# Patient Record
Sex: MALE | Race: WHITE | Employment: FULL TIME | URBAN - METROPOLITAN AREA
[De-identification: names, ages, dates, MRNs, and addresses within clinical notes are randomized per-mention and may not be internally consistent; named-entity substitution may affect disease eponyms.]

---

## 2019-05-08 ENCOUNTER — HOSPITAL ENCOUNTER (EMERGENCY)
Age: 59
Discharge: HOME | End: 2019-05-08
Attending: EMERGENCY MEDICINE
Payer: COMMERCIAL

## 2019-05-08 ENCOUNTER — APPOINTMENT (OUTPATIENT)
Dept: GENERAL RADIOLOGY | Age: 59
End: 2019-05-08
Attending: EMERGENCY MEDICINE
Payer: COMMERCIAL

## 2019-05-08 VITALS
SYSTOLIC BLOOD PRESSURE: 127 MMHG | WEIGHT: 176 LBS | HEIGHT: 71 IN | TEMPERATURE: 98.6 F | HEART RATE: 67 BPM | OXYGEN SATURATION: 94 % | DIASTOLIC BLOOD PRESSURE: 69 MMHG | BODY MASS INDEX: 24.64 KG/M2 | RESPIRATION RATE: 12 BRPM

## 2019-05-08 DIAGNOSIS — R07.9 ACUTE CHEST PAIN: Primary | ICD-10-CM

## 2019-05-08 LAB
ALBUMIN SERPL-MCNC: 3.7 G/DL (ref 3.5–5)
ALBUMIN/GLOB SERPL: 1.1 {RATIO} (ref 1.1–2.2)
ALP SERPL-CCNC: 148 U/L (ref 45–117)
ALT SERPL-CCNC: 28 U/L (ref 12–78)
ANION GAP SERPL CALC-SCNC: 4 MMOL/L (ref 5–15)
AST SERPL-CCNC: 18 U/L (ref 15–37)
ATRIAL RATE: 72 BPM
BASOPHILS # BLD: 0 K/UL (ref 0–0.1)
BASOPHILS NFR BLD: 1 % (ref 0–1)
BILIRUB SERPL-MCNC: 0.9 MG/DL (ref 0.2–1)
BUN SERPL-MCNC: 12 MG/DL (ref 6–20)
BUN/CREAT SERPL: 9 (ref 12–20)
CALCIUM SERPL-MCNC: 9.4 MG/DL (ref 8.5–10.1)
CALCULATED P AXIS, ECG09: 69 DEGREES
CALCULATED R AXIS, ECG10: 10 DEGREES
CALCULATED T AXIS, ECG11: 38 DEGREES
CHLORIDE SERPL-SCNC: 103 MMOL/L (ref 97–108)
CO2 SERPL-SCNC: 32 MMOL/L (ref 21–32)
COMMENT, HOLDF: NORMAL
CREAT SERPL-MCNC: 1.33 MG/DL (ref 0.7–1.3)
D DIMER PPP FEU-MCNC: 0.3 MG/L FEU (ref 0–0.65)
DIAGNOSIS, 93000: NORMAL
DIFFERENTIAL METHOD BLD: ABNORMAL
EOSINOPHIL # BLD: 0.1 K/UL (ref 0–0.4)
EOSINOPHIL NFR BLD: 2 % (ref 0–7)
ERYTHROCYTE [DISTWIDTH] IN BLOOD BY AUTOMATED COUNT: 12.9 % (ref 11.5–14.5)
GLOBULIN SER CALC-MCNC: 3.4 G/DL (ref 2–4)
GLUCOSE SERPL-MCNC: 93 MG/DL (ref 65–100)
HCT VFR BLD AUTO: 52.3 % (ref 36.6–50.3)
HGB BLD-MCNC: 17.1 G/DL (ref 12.1–17)
IMM GRANULOCYTES # BLD AUTO: 0 K/UL (ref 0–0.04)
IMM GRANULOCYTES NFR BLD AUTO: 0 % (ref 0–0.5)
LYMPHOCYTES # BLD: 1.6 K/UL (ref 0.8–3.5)
LYMPHOCYTES NFR BLD: 29 % (ref 12–49)
MCH RBC QN AUTO: 29.5 PG (ref 26–34)
MCHC RBC AUTO-ENTMCNC: 32.7 G/DL (ref 30–36.5)
MCV RBC AUTO: 90.3 FL (ref 80–99)
MONOCYTES # BLD: 0.7 K/UL (ref 0–1)
MONOCYTES NFR BLD: 13 % (ref 5–13)
NEUTS SEG # BLD: 3.2 K/UL (ref 1.8–8)
NEUTS SEG NFR BLD: 56 % (ref 32–75)
NRBC # BLD: 0 K/UL (ref 0–0.01)
NRBC BLD-RTO: 0 PER 100 WBC
P-R INTERVAL, ECG05: 182 MS
PLATELET # BLD AUTO: 125 K/UL (ref 150–400)
PMV BLD AUTO: 12 FL (ref 8.9–12.9)
POTASSIUM SERPL-SCNC: 3.8 MMOL/L (ref 3.5–5.1)
PROT SERPL-MCNC: 7.1 G/DL (ref 6.4–8.2)
Q-T INTERVAL, ECG07: 398 MS
QRS DURATION, ECG06: 86 MS
QTC CALCULATION (BEZET), ECG08: 435 MS
RBC # BLD AUTO: 5.79 M/UL (ref 4.1–5.7)
SAMPLES BEING HELD,HOLD: NORMAL
SODIUM SERPL-SCNC: 139 MMOL/L (ref 136–145)
TROPONIN I BLD-MCNC: <0.04 NG/ML (ref 0–0.08)
TROPONIN I SERPL-MCNC: <0.05 NG/ML
VENTRICULAR RATE, ECG03: 72 BPM
WBC # BLD AUTO: 5.6 K/UL (ref 4.1–11.1)

## 2019-05-08 PROCEDURE — 84484 ASSAY OF TROPONIN QUANT: CPT

## 2019-05-08 PROCEDURE — 99285 EMERGENCY DEPT VISIT HI MDM: CPT

## 2019-05-08 PROCEDURE — 71046 X-RAY EXAM CHEST 2 VIEWS: CPT

## 2019-05-08 PROCEDURE — 85379 FIBRIN DEGRADATION QUANT: CPT

## 2019-05-08 PROCEDURE — 36415 COLL VENOUS BLD VENIPUNCTURE: CPT

## 2019-05-08 PROCEDURE — 80053 COMPREHEN METABOLIC PANEL: CPT

## 2019-05-08 PROCEDURE — 93005 ELECTROCARDIOGRAM TRACING: CPT

## 2019-05-08 PROCEDURE — 85025 COMPLETE CBC W/AUTO DIFF WBC: CPT

## 2019-05-08 RX ORDER — SODIUM CHLORIDE 0.9 % (FLUSH) 0.9 %
5-40 SYRINGE (ML) INJECTION AS NEEDED
Status: DISCONTINUED | OUTPATIENT
Start: 2019-05-08 | End: 2019-05-08 | Stop reason: HOSPADM

## 2019-05-08 RX ORDER — SODIUM CHLORIDE 0.9 % (FLUSH) 0.9 %
5-40 SYRINGE (ML) INJECTION EVERY 8 HOURS
Status: DISCONTINUED | OUTPATIENT
Start: 2019-05-08 | End: 2019-05-08 | Stop reason: HOSPADM

## 2019-05-08 NOTE — ED PROVIDER NOTES
Hx asthma, GERD; presents with left-sided CP; awoke with it this morning approximately 1 hour prior to arrival; describes it as dull and pressure; worse with inspiration; admitted for CP 2012 and had negative w/up; blamed on GERD at the time and eventually had esophageal surgery; no problems with GERD since surgery per pt. Pain radiates to neck and left shoulder. + dyspnea.  + N. No hx HTN, DM, hyperlipidemia, smoking, known FH CAD. No hx DVT/PE. He does travel for work. Past Medical History:  
Diagnosis Date  Asthma  GERD (gastroesophageal reflux disease) Past Surgical History:  
Procedure Laterality Date  HX TONSILLECTOMY History reviewed. No pertinent family history. Social History Socioeconomic History  Marital status:  Spouse name: Not on file  Number of children: Not on file  Years of education: Not on file  Highest education level: Not on file Occupational History  Not on file Social Needs  Financial resource strain: Not on file  Food insecurity:  
  Worry: Not on file Inability: Not on file  Transportation needs:  
  Medical: Not on file Non-medical: Not on file Tobacco Use  Smoking status: Never Smoker Substance and Sexual Activity  Alcohol use: No  
 Drug use: Not on file  Sexual activity: Not on file Lifestyle  Physical activity:  
  Days per week: Not on file Minutes per session: Not on file  Stress: Not on file Relationships  Social connections:  
  Talks on phone: Not on file Gets together: Not on file Attends Caodaism service: Not on file Active member of club or organization: Not on file Attends meetings of clubs or organizations: Not on file Relationship status: Not on file  Intimate partner violence:  
  Fear of current or ex partner: Not on file Emotionally abused: Not on file Physically abused: Not on file Forced sexual activity: Not on file Other Topics Concern  Not on file Social History Narrative  Not on file ALLERGIES: Patient has no known allergies. Review of Systems All other systems reviewed and are negative. Vitals:  
 05/08/19 0555 BP: 142/87 Pulse: 75 Resp: 16 Temp: 98.2 °F (36.8 °C) SpO2: 100% Physical Exam  
Constitutional: He appears well-developed and well-nourished. HENT:  
Head: Normocephalic and atraumatic. Eyes: Conjunctivae are normal.  
Neck: Neck supple. No tracheal deviation present. Cardiovascular: Normal rate, regular rhythm, normal heart sounds and intact distal pulses. Exam reveals no gallop and no friction rub. No murmur heard. Pulmonary/Chest: Effort normal and breath sounds normal.  
Abdominal: Soft. There is no tenderness. Musculoskeletal: He exhibits no edema or deformity. Neurological: He is alert. oriented Skin: Skin is warm and dry. Psychiatric: He has a normal mood and affect. Nursing note and vitals reviewed. MDM Procedures EKG: NSR; rate - 72; normal STOracio MD 
6:15 AM 
 
A/P: awoke with CP this AM one hour prior to arrival; pleuritic in nature; reassuring appearance and exam; VSS; CBC, CMP, D-dimer, trop times 2, EKG, CXR ok; home with PCP/cards f/u.   Cristina Tran MD

## 2019-05-08 NOTE — ED TRIAGE NOTES
Triage note : pt arrives via private car with c/o  Left sided chest discomfort 5/10 that started at 5am   , sob ,  Nausea , and headache  . Pt ambulated to the room . Pt is a&o x4 .

## 2019-05-08 NOTE — ED NOTES
6:54 AM 
Change of shift. Care of patient taken over from Dr. Milena Correia to Dr. Gerri Salcedo; H&P reviewed, bedside handoff complete. Awaiting repeat troponin. 9:26 AM 
Repeat trop neg. VS stable. No symptoms at this time.  
Stable for discharge home to f/u with cardiologist.

## 2019-05-08 NOTE — DISCHARGE INSTRUCTIONS

## 2019-05-23 ENCOUNTER — OFFICE VISIT (OUTPATIENT)
Dept: CARDIOLOGY CLINIC | Age: 59
End: 2019-05-23

## 2019-05-23 VITALS
DIASTOLIC BLOOD PRESSURE: 72 MMHG | BODY MASS INDEX: 24.78 KG/M2 | OXYGEN SATURATION: 98 % | SYSTOLIC BLOOD PRESSURE: 110 MMHG | WEIGHT: 177 LBS | HEART RATE: 78 BPM | HEIGHT: 71 IN

## 2019-05-23 DIAGNOSIS — R07.9 CHEST PAIN, UNSPECIFIED TYPE: Primary | ICD-10-CM

## 2019-05-23 RX ORDER — HYDROCORTISONE 25 MG/G
CREAM TOPICAL 2 TIMES DAILY
COMMUNITY

## 2019-05-23 NOTE — PROGRESS NOTES
Chief Complaint   Patient presents with    Chest Pain (Angina)    New Patient     Visit Vitals  /72 (BP 1 Location: Left arm, BP Patient Position: Sitting)   Pulse 78   Ht 5' 11\" (1.803 m)   Wt 177 lb (80.3 kg)   SpO2 98%   BMI 24.69 kg/m²     Chest pain has had CP again, but not as bad. SOB around ED visit, dissipated after. Dizziness around ED, not since. Occasional bouts. More nausea than dizziness. Swelling denied  Recent hospital visit: 2 weeks ago, was having severe left chest pain, radiating to left arm. Don't think it was an MI, but they couldn't tell me what it was. 5/8/19      Hx of severe GERD, poor diet, drink soda. Consultant. ADHD, get hyper focussed.

## 2019-05-23 NOTE — PROGRESS NOTES
Pilo Baker MD. [facility]              Patient: Bridgette Dewey  : 1960      Today's Date: 2019            HISTORY OF PRESENT ILLNESS:     History of Present Illness:  Referred for chest pain from the ER. Woke up with severe pain radiating to left arm and chest.  Went to [facility] ER on 19. Sent home after ER workup OK. Had mild residual pain for a couple of days, but better since then. No exertional chest pain. I last saw him in  - has done well past several years. PAST MEDICAL HISTORY:     Past Medical History:   Diagnosis Date    Asthma     GERD (gastroesophageal reflux disease)     Hiatal hernia     hiatal hernia surgery     S/P cholecystectomy        Past Surgical History:   Procedure Laterality Date    HX TONSILLECTOMY           MEDICATIONS:     Current Outpatient Medications   Medication Sig Dispense Refill    hydrocortisone (HYTONE) 2.5 % topical cream Apply  to affected area two (2) times a day. use thin layer   Indications: For ears      Lactobacillus acidophilus (PROBIOTIC PO) Take  by mouth.  calcium citrate/vitamin D3 (CALCITRATE-VITAMIN D PO) Take  by mouth.  folic acid/multivit-min/lutein (CENTRUM SILVER PO) Take  by mouth.  vitamin E acetate (VITAMIN E PO) Take  by mouth.  cetirizine (ZYRTEC) 10 mg tablet Take 10 mg by mouth nightly as needed for Allergies.  naproxen sodium (ALEVE) 220 mg tablet Take 220 mg by mouth two (2) times daily as needed. No Known Allergies          SOCIAL HISTORY:     Social History     Tobacco Use    Smoking status: Never Smoker    Smokeless tobacco: Never Used   Substance Use Topics    Alcohol use:  Yes     Alcohol/week: 1.2 oz     Types: 2 Glasses of wine per week     Frequency: 2-4 times a month     Drinks per session: 1 or 2     Binge frequency: Never    Drug use: Not Currently         FAMILY HISTORY:     Family History   Problem Relation Age of Onset    Heart Attack Father              REVIEW OF SYMPTOMS:     Review of Symptoms:  Constitutional: + fever 102 degrees 2 weeks ago   HEENT: Negative for nosebleeds, tinnitus, and vision changes. Respiratory: Negative for cough, wheezing  Cardiovascular: Negative for orthopnea, claudication, syncope, and PND. Gastrointestinal: Negative for abdominal pain, diarrhea, melena. Genitourinary: Negative for dysuria  Musculoskeletal: Negative for myalgias. Skin: Negative for rash  Heme: No problems bleeding. Neurological: Negative for speech change and focal weakness. PHYSICAL EXAM:     Physical Exam:  Visit Vitals  /72 (BP 1 Location: Left arm, BP Patient Position: Sitting)   Pulse 78   Ht 5' 11\" (1.803 m)   Wt 177 lb (80.3 kg)   SpO2 98%   BMI 24.69 kg/m²     Patient appears generally well, mood and affect are appropriate and pleasant. HEENT:  Hearing intact, non-icteric, normocephalic, atraumatic. Neck Exam: Supple, No JVD or carotid bruits. Lung Exam: Clear to auscultation, even breath sounds. Cardiac Exam: Regular rate and rhythm with no murmur  Abdomen: Soft, non-tender, normal bowel sounds. No bruits or masses. Extremities: Moves all ext well. No lower extremity edema. Vascular: 2+ dorsalis pedis pulses bilaterally. Psych: Appropriate affect  Neuro - Grossly intact      LABS / OTHER STUDIES:       Lab Results   Component Value Date/Time    Sodium 139 05/08/2019 05:57 AM    Potassium 3.8 05/08/2019 05:57 AM    Chloride 103 05/08/2019 05:57 AM    CO2 32 05/08/2019 05:57 AM    Anion gap 4 (L) 05/08/2019 05:57 AM    Glucose 93 05/08/2019 05:57 AM    BUN 12 05/08/2019 05:57 AM    Creatinine 1.33 (H) 05/08/2019 05:57 AM    BUN/Creatinine ratio 9 (L) 05/08/2019 05:57 AM    GFR est AA >60 05/08/2019 05:57 AM    GFR est non-AA 55 (L) 05/08/2019 05:57 AM    Calcium 9.4 05/08/2019 05:57 AM    Bilirubin, total 0.9 05/08/2019 05:57 AM    AST (SGOT) 18 05/08/2019 05:57 AM    Alk.  phosphatase 148 (H) 05/08/2019 05:57 AM    Protein, total 7.1 05/08/2019 05:57 AM    Albumin 3.7 05/08/2019 05:57 AM    Globulin 3.4 05/08/2019 05:57 AM    A-G Ratio 1.1 05/08/2019 05:57 AM    ALT (SGPT) 28 05/08/2019 05:57 AM     Lab Results   Component Value Date/Time    WBC 5.6 05/08/2019 05:57 AM    HGB 17.1 (H) 05/08/2019 05:57 AM    HCT 52.3 (H) 05/08/2019 05:57 AM    PLATELET 697 (L) 80/04/2652 05:57 AM    MCV 90.3 05/08/2019 05:57 AM             CARDIAC DIAGNOSTICS:     Cardiac Evaluation Includes:    EKG 5/8/19 - NSR, normal   - I viewed EKG myself     Exercise Cardiolite 1/10/12 - walked 10 min, normal stress EKG and MPI, LVEF 53%    Echo 1/9/12 - LVEF 55%    CT Heart Scan 1/11/12 - CAC score 2. At 20th%        ASSESSMENT AND PLAN:     Assessment and Plan:  1) Atypical chest pain   - Will check a treadmill stress test to rule out high risk CAD findings     2) CV risk management per PCP  - lipids followed by PCP - total chol 140 recently per patient   - He typically eats healthy - stays active     3) Phone FU after testing. See me as needed. Patient expressed understanding of the plan - questions were answered. 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 Kurt Anderson MD, [address]  Ph: [phone]   Ph [phone]      ADDENDUM   5/26/2019  Treadmill Stress test 5/24/19 - walked 11 min (13.4 METS), normal stress EKG. Normal study.      Patient given results after his test.

## 2023-06-26 NOTE — ED NOTES
Discharge instructions given to patient by MD. Pt has been given counseling on medication use and verbalizes understanding. IV d/c. Pt ambulated off of unit in no signs of distress. Stable

## 2024-02-23 ENCOUNTER — OFFICE VISIT (OUTPATIENT)
Age: 64
End: 2024-02-23

## 2024-02-23 VITALS
OXYGEN SATURATION: 95 % | TEMPERATURE: 98.6 F | HEIGHT: 71 IN | BODY MASS INDEX: 23.79 KG/M2 | WEIGHT: 169.9 LBS | SYSTOLIC BLOOD PRESSURE: 121 MMHG | HEART RATE: 82 BPM | DIASTOLIC BLOOD PRESSURE: 76 MMHG | RESPIRATION RATE: 18 BRPM

## 2024-02-23 DIAGNOSIS — J06.9 VIRAL UPPER RESPIRATORY ILLNESS: Primary | ICD-10-CM

## 2024-02-23 LAB
Lab: NORMAL
PERFORMING INSTRUMENT: NORMAL
QC PASS/FAIL: NORMAL
SARS-COV-2, POC: NORMAL

## 2024-02-23 ASSESSMENT — ENCOUNTER SYMPTOMS
COUGH: 1
SHORTNESS OF BREATH: 0

## 2024-02-23 NOTE — PROGRESS NOTES
Normocephalic and atraumatic.   Cardiovascular:      Rate and Rhythm: Normal rate and regular rhythm.      Pulses: Normal pulses.      Heart sounds: Normal heart sounds.   Pulmonary:      Effort: Pulmonary effort is normal.      Breath sounds: Normal breath sounds.   Skin:     General: Skin is warm and dry.   Neurological:      Mental Status: He is alert and oriented to person, place, and time.         Assessment & Plan     Diagnoses and all orders for this visit:  Respiratory symptoms  -     POCT COVID-19, Antigen      No orders to display   Vital signs stable  Pt in no acute distress and afebrile  Pt alert and competent    Patient is previously healthy and immunocompetent, presenting with symptoms suspicious for likely viral upper respiratory infection.  Differential includes acute bronchitis, rhinosinusitis, allergic rhinitis, bacterial pneumonia, or COVID.  Do not suspect underlying cardiopulmonary process.  I considered, but think unlikely, dangerous causes of this patient's symptoms to include ACS, CHF or COPD exacerbations, pneumonia, pneumothorax.  Patient is nontoxic appearing and not in need of emergent medical intervention.    The patient is a good candidate for outpatient therapy based on normal PO intake, reassuring exam with clear lungs on auscultation, normal oxygen saturations and lack of respiratory distress upon discharge.    Discharge decision based on the following:  clinical impression is consistent with outpatient treatment, patient's exam is stable and patient's condition is stable.    -Provided reassurance and reassessment  -Discussed over-the-counter medications for symptomatic relief  -Provided educational material and patient instructions  -Discharged patient with instructions to follow up with PCP  -Advised to go immediately to the ED for worsening or persistent symptoms      I have discussed the results, diagnosis and treatment plan with the patient.  The patient also understands that

## 2024-02-23 NOTE — PATIENT INSTRUCTIONS
Please follow up with your primary care provider within 2-5 days if your signs and symptoms have not resolved or worsened.    Please go immediately to the Emergency Department if you develop shortness of breath, chest pain and uncontrollable fever above 100.4F.    Cough:  Throat lozenges, hot tea, and honey may help  Vicks VapoRub at night to help with cough and relieve muscles aches and pain  If not prescribed a cough medication, Robitussin DM is an option.  It is an over the counter cough medication containing dextromethorphan to help suppress cough at night   *Please only take when absolutely needed, as this is a controlled substance that can cause addiction   *Please only take cough syrup at nighttime as it causes drowsiness   *Do not drive or operate any machinery while taking this medication  If you have high blood pressure, take Coricidin HBP (or the generic form) instead.  Follow instructions on the box.    Congestion:  For thick mucus, take Mucinex (with Guafenesin only) to help thin the mucus.  Follow instructions on the box.  You will need to drink plenty of water with this medication.    Headache/Pain Fever/Body Aches:  If you can take NSAIDs, take Ibuprofen 400-800mg every 8 hours as needed  If you cannot take NSAIDs, take Tylenol 325-500mg every 6 hours as needed    Miscellanous:  Simple foods like chicken noodle soup, smoothies, hot tea with lemon and honey may also help

## 2024-06-07 ENCOUNTER — CLINICAL DOCUMENTATION (OUTPATIENT)
Age: 64
End: 2024-06-07

## 2024-06-07 NOTE — PROGRESS NOTES
Labs drawn 12/7/23:    Lipids: , , HDL 44, VLDL 22, , cholh 3.9, nhdl    Chemistry:  Na 140, K 4.3, BUN 16, Cr 1.01, eGFR 83    Blood count:  Hgb 16.4, Hct 48.7    A1C:  5.2    AST: 18  ALT: 18

## 2024-06-10 ENCOUNTER — OFFICE VISIT (OUTPATIENT)
Age: 64
End: 2024-06-10
Payer: MEDICARE

## 2024-06-10 VITALS
OXYGEN SATURATION: 98 % | BODY MASS INDEX: 24.36 KG/M2 | HEIGHT: 71 IN | HEART RATE: 60 BPM | WEIGHT: 174 LBS | DIASTOLIC BLOOD PRESSURE: 72 MMHG | SYSTOLIC BLOOD PRESSURE: 138 MMHG

## 2024-06-10 DIAGNOSIS — R07.9 CHEST PAIN, UNSPECIFIED TYPE: ICD-10-CM

## 2024-06-10 DIAGNOSIS — R06.09 DYSPNEA ON EXERTION: Primary | ICD-10-CM

## 2024-06-10 DIAGNOSIS — H93.19 TINNITUS: ICD-10-CM

## 2024-06-10 PROCEDURE — 99204 OFFICE O/P NEW MOD 45 MIN: CPT | Performed by: SPECIALIST

## 2024-06-10 PROCEDURE — G8427 DOCREV CUR MEDS BY ELIG CLIN: HCPCS | Performed by: SPECIALIST

## 2024-06-10 PROCEDURE — 1036F TOBACCO NON-USER: CPT | Performed by: SPECIALIST

## 2024-06-10 PROCEDURE — G8420 CALC BMI NORM PARAMETERS: HCPCS | Performed by: SPECIALIST

## 2024-06-10 PROCEDURE — 3017F COLORECTAL CA SCREEN DOC REV: CPT | Performed by: SPECIALIST

## 2024-06-10 PROCEDURE — 93005 ELECTROCARDIOGRAM TRACING: CPT | Performed by: SPECIALIST

## 2024-06-10 PROCEDURE — 93010 ELECTROCARDIOGRAM REPORT: CPT | Performed by: SPECIALIST

## 2024-06-10 RX ORDER — ACETAMINOPHEN 160 MG
TABLET,DISINTEGRATING ORAL
COMMUNITY

## 2024-06-10 RX ORDER — CETIRIZINE HYDROCHLORIDE 10 MG/1
10 TABLET ORAL DAILY
COMMUNITY

## 2024-06-10 NOTE — PROGRESS NOTES
Chief Complaint   Patient presents with    New Patient    Chest Pain     Vitals:    06/10/24 0910   BP: 138/72   Site: Right Upper Arm   Position: Sitting   Cuff Size: Medium Adult   Pulse: 60   SpO2: 98%   Weight: 78.9 kg (174 lb)   Height: 1.803 m (5' 11\")       Chest pain NO     ER, urgent care, or hospitalized outside of [facility] since your last visit?  NO     Refills NO     Previously here in 2019.    Brother has since had CT, calcium score 962.  Mother had carotid arteries cleaned out, mini stroke.    Probiotic 4b  Multivit centr  D3  Calcium  Zyrtec  Cialis daily for urology

## 2024-06-10 NOTE — PROGRESS NOTES
Ling Harper MD. [facility]          Patient: Aurelio Ann  : 1960      Today's Date: 6/10/2024        HISTORY OF PRESENT ILLNESS:     History of Present Illness:  Here for a cardiac evaluation. Brother has CAD.    Has CP very infrequently - random (when he gets up fast) - not exertional.          PAST MEDICAL HISTORY:     Past Medical History:   Diagnosis Date    Asthma     GERD (gastroesophageal reflux disease)     Hiatal hernia     hiatal hernia surgery     S/P cholecystectomy        Past Surgical History:   Procedure Laterality Date    TONSILLECTOMY               CURRENT MEDICATIONS:    .  Current Outpatient Medications   Medication Sig Dispense Refill    Tadalafil (CIALIS PO) Take by mouth daily      Probiotic Product (PROBIOTIC PO) Take by mouth      Multiple Vitamin (MULTIVITAMIN PO) Take by mouth      Cholecalciferol (VITAMIN D3) 50 MCG (2000 UT) CAPS Take by mouth      CALCIUM PO Take by mouth      cetirizine (ZYRTEC) 10 MG tablet Take 1 tablet by mouth daily       No current facility-administered medications for this visit.       No Known Allergies      SOCIAL HISTORY:     Social History     Tobacco Use    Smoking status: Never    Smokeless tobacco: Never   Vaping Use    Vaping Use: Never used   Substance Use Topics    Alcohol use: Never     Alcohol/week: 2.0 standard drinks of alcohol    Drug use: Not Currently         FAMILY HISTORY:     Family History   Problem Relation Age of Onset    Heart Attack Father     Heart Disease Brother          REVIEW OF SYMPTOMS:     Review of Symptoms:  Constitutional: Negative for fever, chills  HEENT: Negative for nosebleeds, tinnitus, and vision changes.   Respiratory: Negative for cough, wheezing  Cardiovascular: Negative for orthopnea, claudication, syncope  Gastrointestinal: Negative for abdominal pain, diarrhea, melena.   Genitourinary: Negative for dysuria  Musculoskeletal: Negative for myalgias.   Skin: Negative for rash  Heme: No problems

## 2024-06-10 NOTE — PROGRESS NOTES
Orders for Treadmill stress test and echo (for chest pain)  CT heart scan  Check carotid dopplers (for tinnitus)    See Leroy in 1 year  per Dr. Haprer's VO.

## 2024-06-21 ENCOUNTER — HOSPITAL ENCOUNTER (OUTPATIENT)
Facility: HOSPITAL | Age: 64
Discharge: HOME | End: 2024-06-21
Attending: SPECIALIST

## 2024-06-21 DIAGNOSIS — Z00.00 ROUTINE GENERAL MEDICAL EXAMINATION AT A HEALTH CARE FACILITY: ICD-10-CM

## 2024-06-21 PROCEDURE — 75571 CT HRT W/O DYE W/CA TEST: CPT

## 2024-06-28 ENCOUNTER — TELEPHONE (OUTPATIENT)
Age: 64
End: 2024-06-28

## 2024-06-28 DIAGNOSIS — Z86.39 HX OF THYROID NODULE: Primary | ICD-10-CM

## 2024-06-28 NOTE — TELEPHONE ENCOUNTER
Attempted to contact patient- LVM    Per Dr. Ling Harper: \"CT heart Scan 6/26/24 - CAC score 78 (at 40th%)   Findings c/w mild CAD.   Recommend Crestor 10 mg daily for primary prevention (recheck CMP and lipids 1 month later)   Stress test results pending

## 2024-07-01 ENCOUNTER — ANCILLARY PROCEDURE (OUTPATIENT)
Age: 64
End: 2024-07-01
Payer: MEDICARE

## 2024-07-01 VITALS
DIASTOLIC BLOOD PRESSURE: 71 MMHG | WEIGHT: 174 LBS | SYSTOLIC BLOOD PRESSURE: 128 MMHG | BODY MASS INDEX: 24.36 KG/M2 | HEIGHT: 71 IN | HEART RATE: 74 BPM

## 2024-07-01 DIAGNOSIS — H93.19 TINNITUS: ICD-10-CM

## 2024-07-01 DIAGNOSIS — R06.09 DYSPNEA ON EXERTION: ICD-10-CM

## 2024-07-01 DIAGNOSIS — R07.9 CHEST PAIN, UNSPECIFIED TYPE: ICD-10-CM

## 2024-07-01 LAB
ECHO BSA: 1.99 M2
ECHO BSA: 1.99 M2
STRESS ANGINA INDEX: 0
STRESS BASELINE DIAS BP: 74 MMHG
STRESS BASELINE HR: 65 BPM
STRESS BASELINE SYS BP: 128 MMHG
STRESS ESTIMATED WORKLOAD: 13.4 METS
STRESS EXERCISE DUR MIN: 11 MIN
STRESS EXERCISE DUR SEC: 8 SEC
STRESS O2 SAT PEAK: 100 %
STRESS O2 SAT REST: 99 %
STRESS PEAK DIAS BP: 86 MMHG
STRESS PEAK SYS BP: 154 MMHG
STRESS PERCENT HR ACHIEVED: 97 %
STRESS POST PEAK HR: 150 BPM
STRESS RATE PRESSURE PRODUCT: NORMAL BPM*MMHG
STRESS TARGET HR: 155 BPM
VAS LEFT CCA DIST EDV: 20.9 CM/S
VAS LEFT CCA DIST PSV: 86.6 CM/S
VAS LEFT CCA PROX EDV: 15.8 CM/S
VAS LEFT CCA PROX PSV: 98.8 CM/S
VAS LEFT ECA EDV: 10.24 CM/S
VAS LEFT ECA PSV: 72.4 CM/S
VAS LEFT ICA DIST EDV: 25.2 CM/S
VAS LEFT ICA DIST PSV: 61.8 CM/S
VAS LEFT ICA MID EDV: 22.3 CM/S
VAS LEFT ICA MID PSV: 67.9 CM/S
VAS LEFT ICA PROX EDV: 10.1 CM/S
VAS LEFT ICA PROX PSV: 38.6 CM/S
VAS LEFT ICA/CCA PSV: 0.69 NO UNITS
VAS LEFT VERTEBRAL EDV: 6.93 CM/S
VAS LEFT VERTEBRAL PSV: 43 CM/S
VAS RIGHT CCA DIST EDV: 18.4 CM/S
VAS RIGHT CCA DIST PSV: 88.4 CM/S
VAS RIGHT CCA PROX EDV: 14.1 CM/S
VAS RIGHT CCA PROX PSV: 89.2 CM/S
VAS RIGHT ECA EDV: 11.14 CM/S
VAS RIGHT ECA PSV: 93.7 CM/S
VAS RIGHT ICA DIST EDV: 20.5 CM/S
VAS RIGHT ICA DIST PSV: 56.9 CM/S
VAS RIGHT ICA MID EDV: 15.6 CM/S
VAS RIGHT ICA MID PSV: 47.2 CM/S
VAS RIGHT ICA PROX EDV: 13.3 CM/S
VAS RIGHT ICA PROX PSV: 42 CM/S
VAS RIGHT ICA/CCA PSV: 0.6 NO UNITS
VAS RIGHT VERTEBRAL EDV: 12.92 CM/S
VAS RIGHT VERTEBRAL PSV: 54.9 CM/S

## 2024-07-01 PROCEDURE — 93017 CV STRESS TEST TRACING ONLY: CPT | Performed by: SPECIALIST

## 2024-07-01 PROCEDURE — 93880 EXTRACRANIAL BILAT STUDY: CPT | Performed by: SPECIALIST

## 2024-07-01 PROCEDURE — 93016 CV STRESS TEST SUPVJ ONLY: CPT | Performed by: SPECIALIST

## 2024-07-01 PROCEDURE — 93018 CV STRESS TEST I&R ONLY: CPT | Performed by: SPECIALIST

## 2024-07-05 ENCOUNTER — TELEPHONE (OUTPATIENT)
Age: 64
End: 2024-07-05

## 2024-07-05 RX ORDER — ROSUVASTATIN CALCIUM 5 MG/1
5 TABLET, COATED ORAL DAILY
Qty: 90 TABLET | Refills: 3 | Status: SHIPPED | OUTPATIENT
Start: 2024-07-05

## 2024-07-05 RX ORDER — ROSUVASTATIN CALCIUM 5 MG/1
5 TABLET, COATED ORAL DAILY
COMMUNITY
End: 2024-07-05 | Stop reason: SDUPTHER

## 2024-07-05 NOTE — TELEPHONE ENCOUNTER
Spoke to patient-     Patient advised of recent CAC score and given Dr Brian recommendations.    Patient voiced understanding and will call back this afternoon with a pharmacy to have medication sent to.

## 2024-07-05 NOTE — TELEPHONE ENCOUNTER
Per Dr. Ling Harper: \"Can you please call.  Just mild carotid plaque and a normal stress test.   He did have 2 small thyroid nodules.  Let's refer him to endocrine for this.   Can you please let him know plan.     Spoke to patient- Patient given the name of an endocrinologist and advised to schedule an appointment. Patient voiced understanding.

## 2024-07-05 NOTE — TELEPHONE ENCOUNTER
Patient is calling because he needs a prescription on his Crestor 5 mg.Patient would like a 90 day refill.Patient would like his medicine to go to the     [facility]  [address] [place] [address] [phone]

## 2024-07-05 NOTE — TELEPHONE ENCOUNTER
Refill per VO of Dr. Harper  Last appt: 6/10/2024    Future Appointments   Date Time Provider Department Center   7/12/2024 11:00 AM [facility] ECHO 3 [facility] BS AMB   11/20/2024 11:10 AM Hai Carreon MD [facility] BS AMB   6/23/2025  9:00 AM [facility]       Requested Prescriptions     Signed Prescriptions Disp Refills    rosuvastatin (CRESTOR) 5 MG tablet 90 tablet 3     Sig: Take 1 tablet by mouth daily     Authorizing Provider: KARINA HARPER     Ordering User: IVETT CANTU

## 2024-07-12 ENCOUNTER — ANCILLARY PROCEDURE (OUTPATIENT)
Age: 64
End: 2024-07-12
Payer: MEDICARE

## 2024-07-12 VITALS
WEIGHT: 174 LBS | BODY MASS INDEX: 24.36 KG/M2 | SYSTOLIC BLOOD PRESSURE: 128 MMHG | DIASTOLIC BLOOD PRESSURE: 71 MMHG | HEIGHT: 71 IN

## 2024-07-12 DIAGNOSIS — R06.09 DYSPNEA ON EXERTION: ICD-10-CM

## 2024-07-12 DIAGNOSIS — R07.9 CHEST PAIN, UNSPECIFIED TYPE: ICD-10-CM

## 2024-07-12 PROCEDURE — 93306 TTE W/DOPPLER COMPLETE: CPT | Performed by: SPECIALIST

## 2024-07-14 LAB
ECHO AO ASC DIAM: 3.3 CM
ECHO AO ASCENDING AORTA INDEX: 1.66 CM/M2
ECHO AO ROOT DIAM: 3.5 CM
ECHO AO ROOT INDEX: 1.76 CM/M2
ECHO AV MEAN GRADIENT: 3 MMHG
ECHO AV MEAN VELOCITY: 0.9 M/S
ECHO AV PEAK GRADIENT: 8 MMHG
ECHO AV PEAK VELOCITY: 1.4 M/S
ECHO AV VELOCITY RATIO: 0.86
ECHO AV VTI: 26 CM
ECHO BSA: 1.99 M2
ECHO EST RA PRESSURE: 3 MMHG
ECHO LA DIAMETER INDEX: 1.71 CM/M2
ECHO LA DIAMETER: 3.4 CM
ECHO LA TO AORTIC ROOT RATIO: 0.97
ECHO LA VOL A-L A2C: 61 ML (ref 18–58)
ECHO LA VOL A-L A4C: 33 ML (ref 18–58)
ECHO LA VOL BP: 41 ML (ref 18–58)
ECHO LA VOL MOD A2C: 55 ML (ref 18–58)
ECHO LA VOL MOD A4C: 31 ML (ref 18–58)
ECHO LA VOL/BSA BIPLANE: 21 ML/M2 (ref 16–34)
ECHO LA VOLUME AREA LENGTH: 46 ML
ECHO LA VOLUME INDEX A-L A2C: 31 ML/M2 (ref 16–34)
ECHO LA VOLUME INDEX A-L A4C: 17 ML/M2 (ref 16–34)
ECHO LA VOLUME INDEX AREA LENGTH: 23 ML/M2 (ref 16–34)
ECHO LA VOLUME INDEX MOD A2C: 28 ML/M2 (ref 16–34)
ECHO LA VOLUME INDEX MOD A4C: 16 ML/M2 (ref 16–34)
ECHO LV E' LATERAL VELOCITY: 12 CM/S
ECHO LV E' SEPTAL VELOCITY: 9 CM/S
ECHO LV EDV A2C: 56 ML
ECHO LV EDV A4C: 93 ML
ECHO LV EDV BP: 76 ML (ref 67–155)
ECHO LV EDV INDEX A4C: 47 ML/M2
ECHO LV EDV INDEX BP: 38 ML/M2
ECHO LV EDV NDEX A2C: 28 ML/M2
ECHO LV EJECTION FRACTION A2C: 65 %
ECHO LV EJECTION FRACTION A4C: 70 %
ECHO LV EJECTION FRACTION BIPLANE: 68 % (ref 55–100)
ECHO LV ESV A2C: 20 ML
ECHO LV ESV A4C: 28 ML
ECHO LV ESV BP: 24 ML (ref 22–58)
ECHO LV ESV INDEX A2C: 10 ML/M2
ECHO LV ESV INDEX A4C: 14 ML/M2
ECHO LV ESV INDEX BP: 12 ML/M2
ECHO LV FRACTIONAL SHORTENING: 35 % (ref 28–44)
ECHO LV INTERNAL DIMENSION DIASTOLE INDEX: 2.16 CM/M2
ECHO LV INTERNAL DIMENSION DIASTOLIC: 4.3 CM (ref 4.2–5.9)
ECHO LV INTERNAL DIMENSION SYSTOLIC INDEX: 1.41 CM/M2
ECHO LV INTERNAL DIMENSION SYSTOLIC: 2.8 CM
ECHO LV IVSD: 1.4 CM (ref 0.6–1)
ECHO LV MASS 2D: 207.8 G (ref 88–224)
ECHO LV MASS INDEX 2D: 104.4 G/M2 (ref 49–115)
ECHO LV POSTERIOR WALL DIASTOLIC: 1.2 CM (ref 0.6–1)
ECHO LV RELATIVE WALL THICKNESS RATIO: 0.56
ECHO LVOT AV VTI INDEX: 0.92
ECHO LVOT MEAN GRADIENT: 3 MMHG
ECHO LVOT PEAK GRADIENT: 5 MMHG
ECHO LVOT PEAK VELOCITY: 1.2 M/S
ECHO LVOT VTI: 23.8 CM
ECHO MV A VELOCITY: 0.73 M/S
ECHO MV AREA PHT: 3.4 CM2
ECHO MV E DECELERATION TIME (DT): 221.2 MS
ECHO MV E VELOCITY: 0.55 M/S
ECHO MV E/A RATIO: 0.75
ECHO MV E/E' LATERAL: 4.58
ECHO MV E/E' RATIO (AVERAGED): 5.35
ECHO MV E/E' SEPTAL: 6.11
ECHO MV PRESSURE HALF TIME (PHT): 64.1 MS
ECHO RA AREA 4C: 20.8 CM2
ECHO RA END SYSTOLIC VOLUME APICAL 4 CHAMBER INDEX BSA: 30 ML/M2
ECHO RA VOLUME AREA LENGTH APICAL 4 CHAMBER: 67 ML
ECHO RA VOLUME: 60 ML
ECHO RIGHT VENTRICULAR SYSTOLIC PRESSURE (RVSP): 23 MMHG
ECHO RV FREE WALL PEAK S': 12 CM/S
ECHO RV INTERNAL DIMENSION: 4.4 CM
ECHO RV TAPSE: 2.2 CM (ref 1.7–?)
ECHO TV REGURGITANT MAX VELOCITY: 2.25 M/S
ECHO TV REGURGITANT PEAK GRADIENT: 20 MMHG

## 2024-07-14 PROCEDURE — 93306 TTE W/DOPPLER COMPLETE: CPT | Performed by: SPECIALIST

## 2024-07-15 ENCOUNTER — TELEPHONE (OUTPATIENT)
Age: 64
End: 2024-07-15

## 2024-07-15 NOTE — TELEPHONE ENCOUNTER
Called pt,    Per Dr. Ling Harper: \"Can you please let him know that echo shows normal heart function.\"  Future Appointments   Date Time Provider Department Center   11/20/2024 11:10 AM Hai Carreon MD [facility] BS AMB   6/23/2025  9:00 AM Ling Harper MD [facility] BS AMB     Confirmed NOV.

## 2024-10-14 ENCOUNTER — HOSPITAL ENCOUNTER (OUTPATIENT)
Facility: HOSPITAL | Age: 64
Discharge: HOME | End: 2024-10-17
Attending: INTERNAL MEDICINE
Payer: MEDICARE

## 2024-10-14 DIAGNOSIS — R63.4 ABNORMAL WEIGHT LOSS: ICD-10-CM

## 2024-10-14 DIAGNOSIS — R10.84 GENERALIZED ABDOMINAL PAIN: ICD-10-CM

## 2024-10-14 DIAGNOSIS — K52.9 OSMOTIC DIARRHEA: ICD-10-CM

## 2024-10-14 DIAGNOSIS — Z80.0 FAMILY HISTORY OF PANCREATIC CANCER: ICD-10-CM

## 2024-10-14 LAB — CREAT BLD-MCNC: 1.1 MG/DL (ref 0.6–1.3)

## 2024-10-14 PROCEDURE — 6360000004 HC RX CONTRAST MEDICATION: Performed by: INTERNAL MEDICINE

## 2024-10-14 PROCEDURE — 74178 CT ABD&PLV WO CNTR FLWD CNTR: CPT

## 2024-10-14 PROCEDURE — 82565 ASSAY OF CREATININE: CPT

## 2024-10-14 RX ORDER — IOPAMIDOL 755 MG/ML
100 INJECTION, SOLUTION INTRAVASCULAR
Status: COMPLETED | OUTPATIENT
Start: 2024-10-14 | End: 2024-10-14

## 2024-10-14 RX ADMIN — IOPAMIDOL 100 ML: 755 INJECTION, SOLUTION INTRAVENOUS at 16:12

## 2025-04-11 RX ORDER — ROSUVASTATIN CALCIUM 5 MG/1
5 TABLET, COATED ORAL DAILY
Qty: 90 TABLET | Refills: 3 | Status: SHIPPED | OUTPATIENT
Start: 2025-04-11

## 2025-04-11 NOTE — TELEPHONE ENCOUNTER
Refill per VO of Dr. Harper  Last appt: 6/10/2024    Future Appointments   Date Time Provider Department Center   6/23/2025  9:00 AM Ling Harper MD [facility] BS AMB       Requested Prescriptions     Signed Prescriptions Disp Refills    rosuvastatin (CRESTOR) 5 MG tablet 90 tablet 3     Sig: TAKE ONE TABLET BY MOUTH ONE TIME DAILY     Authorizing Provider: LING HARPER     Ordering User: ALIDA PEACOCK

## 2025-06-23 ENCOUNTER — OFFICE VISIT (OUTPATIENT)
Age: 65
End: 2025-06-23
Payer: MEDICARE

## 2025-06-23 VITALS
OXYGEN SATURATION: 98 % | SYSTOLIC BLOOD PRESSURE: 122 MMHG | DIASTOLIC BLOOD PRESSURE: 78 MMHG | WEIGHT: 171 LBS | HEART RATE: 64 BPM | HEIGHT: 71 IN | BODY MASS INDEX: 23.94 KG/M2

## 2025-06-23 DIAGNOSIS — I70.90 ATHEROSCLEROSIS: Primary | ICD-10-CM

## 2025-06-23 DIAGNOSIS — R06.09 DYSPNEA ON EXERTION: ICD-10-CM

## 2025-06-23 DIAGNOSIS — R07.9 CHEST PAIN, UNSPECIFIED TYPE: ICD-10-CM

## 2025-06-23 PROCEDURE — 99214 OFFICE O/P EST MOD 30 MIN: CPT | Performed by: SPECIALIST

## 2025-06-23 PROCEDURE — G8427 DOCREV CUR MEDS BY ELIG CLIN: HCPCS | Performed by: SPECIALIST

## 2025-06-23 PROCEDURE — 93005 ELECTROCARDIOGRAM TRACING: CPT | Performed by: SPECIALIST

## 2025-06-23 PROCEDURE — 3017F COLORECTAL CA SCREEN DOC REV: CPT | Performed by: SPECIALIST

## 2025-06-23 PROCEDURE — G8420 CALC BMI NORM PARAMETERS: HCPCS | Performed by: SPECIALIST

## 2025-06-23 PROCEDURE — 1123F ACP DISCUSS/DSCN MKR DOCD: CPT | Performed by: SPECIALIST

## 2025-06-23 PROCEDURE — 93010 ELECTROCARDIOGRAM REPORT: CPT | Performed by: SPECIALIST

## 2025-06-23 PROCEDURE — 1036F TOBACCO NON-USER: CPT | Performed by: SPECIALIST

## 2025-06-23 RX ORDER — ROSUVASTATIN CALCIUM 10 MG/1
10 TABLET, COATED ORAL DAILY
Qty: 90 TABLET | Refills: 3 | Status: SHIPPED | OUTPATIENT
Start: 2025-06-23

## 2025-06-23 RX ORDER — DICLOFENAC SODIUM 75 MG/1
TABLET, DELAYED RELEASE ORAL
COMMUNITY
Start: 2025-06-19

## 2025-06-23 RX ORDER — BUPROPION HYDROCHLORIDE 150 MG/1
150 TABLET ORAL
COMMUNITY
Start: 2025-03-06

## 2025-06-23 NOTE — PROGRESS NOTES
Chief Complaint   Patient presents with    Chest Pain     MCGOWAN     Vitals:    06/23/25 0913   BP: 122/78   BP Site: Left Upper Arm   Patient Position: Sitting   Pulse: 64   SpO2: 98%   Weight: 77.6 kg (171 lb)   Height: 1.803 m (5' 11\")     Chest pain: DENIED     Recent hospital stays: DENIED     Refills: DENIED

## 2025-06-23 NOTE — PATIENT INSTRUCTIONS
drinks like soda.  The Mediterranean diet may also include red wine with your meal--1 glass each day for women and up to 2 glasses a day for men.  Tips for eating at home  Use herbs, spices, garlic, lemon zest, and citrus juice instead of salt to add flavor to foods.  Add avocado slices to your sandwich instead of plata.  Have fish for lunch or dinner instead of red meat. Brush the fish with olive oil, and broil or grill it.  Sprinkle your salad with seeds or nuts instead of cheese.  Cook with olive or canola oil instead of butter or oils that are high in saturated fat.  Switch from 2% milk or whole milk to 1% or fat-free milk.  Dip raw vegetables in a vinaigrette dressing or hummus instead of dips made from mayonnaise or sour cream.  Have a piece of fruit for dessert instead of a piece of cake. Try baked apples, or have some dried fruit.  Tips for eating out  Try broiled, grilled, baked, or poached fish instead of having it fried or breaded.  Ask your  to have your meals prepared with olive oil instead of butter.  Order dishes made with marinara sauce or sauces made from olive oil. Avoid sauces made from cream or mayonnaise.  Choose whole-grain breads, whole wheat pasta and pizza crust, brown rice, beans, and lentils.  Cut back on butter or margarine on bread. Instead, you can dip your bread in a small amount of olive oil.  Ask for a side salad or grilled vegetables instead of french fries or chips.  Where can you learn more?  Go to https://www.[employer].net/patientEd and enter O407 to learn more about \"Learning About the Mediterranean Diet.\"  Current as of: October 7, 2024  Content Version: 14.5  © 3902-8949 [employer].   Care instructions adapted under license by [employer]. If you have questions about a medical condition or this instruction, always ask your healthcare professional. [employer], disclaims any warranty or liability for your use of this information.

## 2025-06-23 NOTE — PROGRESS NOTES
Ling Harper MD. [facility]          Patient: Aurelio Ann  : 1960      Today's Date: 2025        HISTORY OF PRESENT ILLNESS:     History of Present Illness:    Doing well - no cardiac complaints - no CP or sig SOB.        PAST MEDICAL HISTORY:     Past Medical History:   Diagnosis Date    Asthma     Atherosclerosis     GERD (gastroesophageal reflux disease)     Hiatal hernia     hiatal hernia surgery     S/P cholecystectomy        Past Surgical History:   Procedure Laterality Date    TONSILLECTOMY               CURRENT MEDICATIONS:    .  Current Outpatient Medications   Medication Sig Dispense Refill    buPROPion (WELLBUTRIN XL) 150 MG extended release tablet Take 1 tablet by mouth      diclofenac (VOLTAREN) 75 MG EC tablet TAKE 1 TABLET BY MOUTH TWO TIMES DAILY AS NEEDED      Tadalafil (CIALIS PO) Take by mouth daily      Probiotic Product (PROBIOTIC PO) Take by mouth      Multiple Vitamin (MULTIVITAMIN PO) Take by mouth      Cholecalciferol (VITAMIN D3) 50 MCG (2000 UT) CAPS Take by mouth      CALCIUM PO Take by mouth      cetirizine (ZYRTEC) 10 MG tablet Take 1 tablet by mouth daily      rosuvastatin (CRESTOR) 10 MG tablet Take 1 tablet by mouth daily 90 tablet 3     No current facility-administered medications for this visit.       No Known Allergies      SOCIAL HISTORY:     Social History     Tobacco Use    Smoking status: Never    Smokeless tobacco: Never   Vaping Use    Vaping status: Never Used   Substance Use Topics    Alcohol use: Never     Alcohol/week: 2.0 standard drinks of alcohol    Drug use: Not Currently         FAMILY HISTORY:     Family History   Problem Relation Age of Onset    Heart Attack Father     Heart Disease Brother          REVIEW OF SYMPTOMS:     Review of Symptoms:  Constitutional: Negative for fever, chills  HEENT: Negative for nosebleeds, tinnitus, and vision changes.   Respiratory: Negative for cough, wheezing  Cardiovascular: Negative for orthopnea, claudication,